# Patient Record
Sex: MALE | Race: WHITE | URBAN - METROPOLITAN AREA
[De-identification: names, ages, dates, MRNs, and addresses within clinical notes are randomized per-mention and may not be internally consistent; named-entity substitution may affect disease eponyms.]

---

## 2017-05-18 PROCEDURE — 81003 URINALYSIS AUTO W/O SCOPE: CPT | Performed by: FAMILY MEDICINE

## 2018-12-07 PROCEDURE — 88305 TISSUE EXAM BY PATHOLOGIST: CPT | Performed by: INTERNAL MEDICINE

## 2019-09-07 PROCEDURE — 84238 ASSAY NONENDOCRINE RECEPTOR: CPT | Performed by: INTERNAL MEDICINE

## 2024-09-14 ENCOUNTER — HOSPITAL ENCOUNTER (OUTPATIENT)
Age: 54
Discharge: HOME | End: 2024-09-14
Payer: COMMERCIAL

## 2024-09-14 VITALS
WEIGHT: 225 LBS | OXYGEN SATURATION: 98 % | DIASTOLIC BLOOD PRESSURE: 93 MMHG | SYSTOLIC BLOOD PRESSURE: 149 MMHG | HEART RATE: 74 BPM | TEMPERATURE: 97 F | BODY MASS INDEX: 28.88 KG/M2 | RESPIRATION RATE: 20 BRPM | HEIGHT: 74 IN

## 2024-09-14 DIAGNOSIS — S40.022A TRAUMATIC ECCHYMOSIS OF LEFT UPPER ARM, INITIAL ENCOUNTER: Primary | ICD-10-CM

## 2024-09-14 PROCEDURE — 99202 OFFICE O/P NEW SF 15 MIN: CPT

## 2024-09-14 PROCEDURE — 99203 OFFICE O/P NEW LOW 30 MIN: CPT

## 2024-09-14 RX ORDER — ASPIRIN 81 MG/1
81 TABLET ORAL AS NEEDED
COMMUNITY

## 2024-09-14 NOTE — ED PROVIDER NOTES
Patient Seen in: [facility]      History     Chief Complaint   Patient presents with    Bruising     Stated Complaint: bruising on left arm, per patient, \"internal bleeding\", numbness, tingly    Subjective:   52 yo male presents to the immediate care with c/o bruising.  Patient states he donated blood on Saturday.  When he was donating the phlebotomist didn't get right into the vein and missed it initially.  He was fine until Tuesday when his arm started to bruise.  He has been having some tingling to his forearm and fingers and the bruising has worsened.  This morning his bicep was \"jumping\" and he got concerned.  Patient has a history of hemochromatosis and donates blood every 2 months.  He was taking a baby aspirin daily, but stopped when the bruising started.  He denies any fever, chills, or heat to the arm.      The history is provided by the patient.         Objective:   Past Medical History:    BACK PAIN    Hemochromatosis    Varicella              Past Surgical History:   Procedure Laterality Date    Colonoscopy N/A 12/7/2018    Procedure: COLONOSCOPY, POSSIBLE BIOPSY, POSSIBLE POLYPECTOMY 18524;  Surgeon: Markie Villalba MD;  Location: [facility]    Egd  11/07/2019    Dr. Luna    [facility]                Social History     Socioeconomic History    Marital status:    Tobacco Use    Smoking status: Never    Smokeless tobacco: Never   Vaping Use    Vaping status: Never Used   Substance and Sexual Activity    Alcohol use: Yes     Comment: occasional    Drug use: No   Other Topics Concern    Exercise Yes              Review of Systems   Constitutional: Negative.    Musculoskeletal: Negative.  Negative for myalgias.   Skin:  Positive for color change.   All other systems reviewed and are negative.      Positive for stated Chief Complaint: Bruising    Other systems are as noted in HPI.  Constitutional and vital signs reviewed.      All other  systems reviewed and negative except as noted above.    Physical Exam     ED Triage Vitals [09/14/24 1215]   BP (!) 149/93   Pulse 74   Resp 20   Temp 97.4 °F (36.3 °C)   Temp src Temporal   SpO2 98 %   O2 Device None (Room air)       Current Vitals:   Vital Signs  BP: (!) 149/93  Pulse: 74  Resp: 20  Temp: 97.4 °F (36.3 °C)  Temp src: Temporal    Oxygen Therapy  SpO2: 98 %  O2 Device: None (Room air)            Physical Exam  Vitals and nursing note reviewed.   Constitutional:       General: He is not in acute distress.     Appearance: Normal appearance. He is normal weight. He is not ill-appearing.   HENT:      Head: Normocephalic and atraumatic.      Mouth/Throat:      Mouth: Mucous membranes are moist.      Pharynx: Oropharynx is clear.   Eyes:      Conjunctiva/sclera: Conjunctivae normal.   Cardiovascular:      Rate and Rhythm: Normal rate and regular rhythm.      Pulses: Normal pulses.      Heart sounds: Normal heart sounds.   Pulmonary:      Effort: Pulmonary effort is normal. No respiratory distress.      Breath sounds: Normal breath sounds.   Musculoskeletal:         General: Swelling and tenderness present. Normal range of motion.      Comments: Left superior and middle forearm are mildly edematous with deep purple ecchymosis with yellowing at the edges.  There is tenderness with palpation of the area.  ROM, motor strength and sensation intact. Cap refill <2 sec and radial pulse is 2+.    Skin:     General: Skin is warm and dry.      Capillary Refill: Capillary refill takes less than 2 seconds.      Findings: Bruising present.   Neurological:      General: No focal deficit present.      Mental Status: He is alert and oriented to person, place, and time.   Psychiatric:         Mood and Affect: Mood normal.         Behavior: Behavior normal.           ED Course   Labs Reviewed - No data to display           MDM              Medical Decision Making  53-year-old male with traumatic ecchymosis of the left  forearm.  Supportive management at home.  No evidence of sepsis or cellulitis.  F/u with PCP or return as needed.  Do not feel that any labs, imaging, or antibiotics are necessary at this time.    Risk  OTC drugs.        Disposition and Plan     Clinical Impression:  1. Traumatic ecchymosis of left upper arm, initial encounter         Disposition:  Discharge  9/14/2024  1:06 pm    Follow-up:  Leno Nieves MD  [address] [place] [address] [phone]      As needed          Medications Prescribed:  Discharge Medication List as of 9/14/2024  1:09 PM

## 2024-09-14 NOTE — DISCHARGE INSTRUCTIONS
Rest and alternate cool compresses with warm, moist compresses.   Take Tylenol and/or ibuprofen as needed for pain.   Continue to hold the baby aspirin until resolved.   Follow up with your PCP as needed.

## 2024-09-14 NOTE — ED INITIAL ASSESSMENT (HPI)
Pt c/o left arm bruising after donated blood 9/7/24, bruising , bicep muscle spasm, slight numbness and tingling to right fingers started 9/10/24. Pt states he spoke with pcp 9/13/24.

## 2025-01-30 ENCOUNTER — OFFICE VISIT (OUTPATIENT)
Dept: FAMILY MEDICINE CLINIC | Facility: CLINIC | Age: 55
End: 2025-01-30
Payer: COMMERCIAL

## 2025-01-30 ENCOUNTER — TELEPHONE (OUTPATIENT)
Dept: FAMILY MEDICINE CLINIC | Facility: CLINIC | Age: 55
End: 2025-01-30

## 2025-01-30 VITALS
HEART RATE: 79 BPM | DIASTOLIC BLOOD PRESSURE: 80 MMHG | OXYGEN SATURATION: 99 % | SYSTOLIC BLOOD PRESSURE: 122 MMHG | RESPIRATION RATE: 18 BRPM | HEIGHT: 74 IN | WEIGHT: 222 LBS | BODY MASS INDEX: 28.49 KG/M2

## 2025-01-30 DIAGNOSIS — R09.81 NASAL CONGESTION: ICD-10-CM

## 2025-01-30 DIAGNOSIS — B35.1 ONYCHOMYCOSIS: Primary | ICD-10-CM

## 2025-01-30 DIAGNOSIS — M77.50 TENDONITIS OF ANKLE OR FOOT: ICD-10-CM

## 2025-01-30 DIAGNOSIS — M76.61 ACHILLES TENDINITIS OF RIGHT LOWER EXTREMITY: ICD-10-CM

## 2025-01-30 DIAGNOSIS — Z00.00 LABORATORY EXAMINATION ORDERED AS PART OF A COMPLETE PHYSICAL EXAMINATION: ICD-10-CM

## 2025-01-30 DIAGNOSIS — E83.110 HEREDITARY HEMOCHROMATOSIS: ICD-10-CM

## 2025-01-30 DIAGNOSIS — G47.33 OSA (OBSTRUCTIVE SLEEP APNEA): ICD-10-CM

## 2025-01-30 PROCEDURE — 99204 OFFICE O/P NEW MOD 45 MIN: CPT | Performed by: FAMILY MEDICINE

## 2025-01-30 PROCEDURE — 3074F SYST BP LT 130 MM HG: CPT | Performed by: FAMILY MEDICINE

## 2025-01-30 PROCEDURE — 3008F BODY MASS INDEX DOCD: CPT | Performed by: FAMILY MEDICINE

## 2025-01-30 PROCEDURE — 3079F DIAST BP 80-89 MM HG: CPT | Performed by: FAMILY MEDICINE

## 2025-01-30 RX ORDER — FLUTICASONE PROPIONATE 50 MCG
SPRAY, SUSPENSION (ML) NASAL
COMMUNITY
Start: 2024-03-02 | End: 2025-01-30

## 2025-01-30 RX ORDER — FLUTICASONE PROPIONATE 50 MCG
2 SPRAY, SUSPENSION (ML) NASAL DAILY
Qty: 3 EACH | Refills: 3 | Status: SHIPPED | OUTPATIENT
Start: 2025-01-30

## 2025-01-30 RX ORDER — EFINACONAZOLE 100 MG/ML
1 SOLUTION TOPICAL DAILY
Qty: 12 ML | Refills: 3 | Status: SHIPPED | OUTPATIENT
Start: 2025-01-30

## 2025-01-30 NOTE — PROGRESS NOTES
Subjective:   Patient ID: Jd Howard is a 54 year old male.    Chronic marsha.  Using mouth piece.  It cracked.    Walks a lot regularly.  Now if walking down the steps or just randomly at time has pain in right achilles.      Onychomycosis.  On Jublia and its helping.  Wants to continue it.    Nasal congestion.  Wants refill on fluticasone.     Hereditary hemochromatosis.  Seeing hematologist.  Getting blood drawn every 2 months.  Last ferritin level was too low.          History/Other:   Review of Systems   All other systems reviewed and are negative.    Current Outpatient Medications   Medication Sig Dispense Refill    Efinaconazole (JUBLIA) 10 % External Solution Apply 1 Application topically daily. 12 mL 3    fluticasone propionate 50 MCG/ACT Nasal Suspension 2 sprays by Each Nare route daily. 3 each 3    aspirin 81 MG Oral Tab EC Take 1 tablet (81 mg total) by mouth as needed for Pain.       Allergies:Allergies[1]    Objective:   Physical Exam  Vitals reviewed.   Constitutional:       General: He is not in acute distress.     Appearance: He is well-developed. He is not diaphoretic.   Eyes:      General: No scleral icterus.        Right eye: No discharge.         Left eye: No discharge.      Conjunctiva/sclera: Conjunctivae normal.   Cardiovascular:      Rate and Rhythm: Normal rate and regular rhythm.      Heart sounds: Normal heart sounds.   Pulmonary:      Effort: Pulmonary effort is normal. No respiratory distress.      Breath sounds: Normal breath sounds. No wheezing or rales.   Musculoskeletal:      Comments: No ankle or achilles or heel tenderness.  No swelling.  no pain with toe off.  No pain with ankle inversion.   Skin:     Comments: Onychomychosis left big toe.         Assessment & Plan:   1. Onychomycosis    2. Nasal congestion    3. MARSHA (obstructive sleep apnea)    4. Achilles tendinitis of right lower extremity    5. Tendonitis of ankle or foot    6. Hereditary hemochromatosis    7. Laboratory  examination ordered as part of a complete physical examination      1. Onychomycosis  - Efinaconazole (JUBLIA) 10 % External Solution; Apply 1 Application topically daily.  Dispense: 12 mL; Refill: 3    2. Nasal congestion  - fluticasone propionate 50 MCG/ACT Nasal Suspension; 2 sprays by Each Nare route daily.  Dispense: 3 each; Refill: 3  - ENT Referral - In Network    3. JOSE (obstructive sleep apnea)  Try to get oral mouth piece.    4. Achilles tendinitis of right lower extremity  - OP REFERRAL TO [facility]    5. Tendonitis of ankle or foot  - OP REFERRAL TO [facility]    6. Hereditary hemochromatosis  - Ferritin; Future  - Iron And Tibc [E]; Future    7. Laboratory examination ordered as part of a complete physical examination  - Lipid Panel; Future  - PSA Total, Screen; Future  - CBC With Differential With Platelet; Future  - Comp Metabolic Panel (14); Future  - TSH W Reflex To Free T4; Future  - Ferritin; Future  - Vitamin D; Future  - Iron And Tibc [E]; Future      Orders Placed This Encounter   Procedures    Lipid Panel    PSA Total, Screen    CBC With Differential With Platelet    Comp Metabolic Panel (14)    TSH W Reflex To Free T4    Ferritin    Vitamin D    Iron And Tibc [E]       Meds This Visit:  Requested Prescriptions     Signed Prescriptions Disp Refills    Efinaconazole (JUBLIA) 10 % External Solution 12 mL 3     Sig: Apply 1 Application topically daily.    fluticasone propionate 50 MCG/ACT Nasal Suspension 3 each 3     Si sprays by Each Nare route daily.       Imaging & Referrals:  OP REFERRAL TO [facility]  ENT - INTERNAL       [1] No Known Allergies

## 2025-01-30 NOTE — PATIENT INSTRUCTIONS
Consider getting Aspire procedure for sleep apnea, but if you get Nasal Surgery you might not need this.

## 2025-01-30 NOTE — TELEPHONE ENCOUNTER
Call people below.  Look up number in google.  Pt doesn't have it.  [facility] sleep better appliance.  [employer]    His mouthpiece cracked.  Can he get the same one they gave him last time for sleep apnea.

## 2025-01-31 NOTE — TELEPHONE ENCOUNTER
Can't they pull up his records and order the same?  If they pull up his record we could write a script.

## 2025-01-31 NOTE — TELEPHONE ENCOUNTER
I called [facility]  at [phone] they state they need the info from the ordering physician and original invoice # from order.I notified patient he states this was ordered through his dentist previously who has since retired. [facility] would not provide me with any info if this appliance  could be replaced.  Patient wants Dr Lyn to order for him.Please advise. Should he contact dentist?

## 2025-02-01 ENCOUNTER — LAB ENCOUNTER (OUTPATIENT)
Dept: LAB | Age: 55
End: 2025-02-01
Attending: FAMILY MEDICINE
Payer: COMMERCIAL

## 2025-02-01 DIAGNOSIS — Z00.00 LABORATORY EXAMINATION ORDERED AS PART OF A COMPLETE PHYSICAL EXAMINATION: ICD-10-CM

## 2025-02-01 DIAGNOSIS — E83.110 HEREDITARY HEMOCHROMATOSIS: ICD-10-CM

## 2025-02-01 LAB
DEPRECATED HBV CORE AB SER IA-ACNC: 13 NG/ML
IRON SATN MFR SERPL: 25 %
IRON SERPL-MCNC: 76 UG/DL
TOTAL IRON BINDING CAPACITY: 300 UG/DL (ref 250–425)
TRANSFERRIN SERPL-MCNC: 236 MG/DL (ref 215–365)

## 2025-02-01 PROCEDURE — 83540 ASSAY OF IRON: CPT | Performed by: FAMILY MEDICINE

## 2025-02-01 PROCEDURE — 83550 IRON BINDING TEST: CPT | Performed by: FAMILY MEDICINE

## 2025-02-01 PROCEDURE — 82728 ASSAY OF FERRITIN: CPT | Performed by: FAMILY MEDICINE

## 2025-02-02 ENCOUNTER — PATIENT MESSAGE (OUTPATIENT)
Dept: FAMILY MEDICINE CLINIC | Facility: CLINIC | Age: 55
End: 2025-02-02

## 2025-02-03 DIAGNOSIS — B35.1 ONYCHOMYCOSIS: ICD-10-CM

## 2025-02-03 NOTE — TELEPHONE ENCOUNTER
Medication requested: Efinaconazole (JUBLIA) 10 % External Solution           Pharmacy name/location:  [id number] [address] [phone], [phone] [[id number]]     LOV:  1/30/25      Additional notes:  patient states script not avail through [facility]

## 2025-02-04 RX ORDER — EFINACONAZOLE 100 MG/ML
1 SOLUTION TOPICAL DAILY
Qty: 12 ML | Refills: 3 | Status: SHIPPED | OUTPATIENT
Start: 2025-02-04

## 2025-04-07 ENCOUNTER — OFFICE VISIT (OUTPATIENT)
Dept: FAMILY MEDICINE CLINIC | Facility: CLINIC | Age: 55
End: 2025-04-07
Payer: COMMERCIAL

## 2025-04-07 ENCOUNTER — LAB ENCOUNTER (OUTPATIENT)
Dept: LAB | Age: 55
End: 2025-04-07
Attending: FAMILY MEDICINE
Payer: COMMERCIAL

## 2025-04-07 VITALS
BODY MASS INDEX: 27.59 KG/M2 | SYSTOLIC BLOOD PRESSURE: 118 MMHG | WEIGHT: 215 LBS | OXYGEN SATURATION: 98 % | DIASTOLIC BLOOD PRESSURE: 82 MMHG | HEART RATE: 78 BPM | RESPIRATION RATE: 16 BRPM | HEIGHT: 74 IN

## 2025-04-07 DIAGNOSIS — Z00.00 LABORATORY EXAMINATION ORDERED AS PART OF A COMPLETE PHYSICAL EXAMINATION: ICD-10-CM

## 2025-04-07 DIAGNOSIS — Z23 NEED FOR SHINGLES VACCINE: ICD-10-CM

## 2025-04-07 DIAGNOSIS — Z00.00 ANNUAL PHYSICAL EXAM: Primary | ICD-10-CM

## 2025-04-07 DIAGNOSIS — Z00.00 ANNUAL PHYSICAL EXAM: ICD-10-CM

## 2025-04-07 DIAGNOSIS — E55.9 VITAMIN D DEFICIENCY: ICD-10-CM

## 2025-04-07 DIAGNOSIS — E61.1 IRON DEFICIENCY: ICD-10-CM

## 2025-04-07 LAB
ALBUMIN SERPL-MCNC: 4.9 G/DL (ref 3.2–4.8)
ALBUMIN/GLOB SERPL: 1.5 {RATIO} (ref 1–2)
ALP LIVER SERPL-CCNC: 75 U/L
ALT SERPL-CCNC: 26 U/L
ANION GAP SERPL CALC-SCNC: 12 MMOL/L (ref 0–18)
AST SERPL-CCNC: 28 U/L (ref ?–34)
BASOPHILS # BLD AUTO: 0.02 X10(3) UL (ref 0–0.2)
BASOPHILS NFR BLD AUTO: 0.4 %
BILIRUB SERPL-MCNC: 0.5 MG/DL (ref 0.3–1.2)
BUN BLD-MCNC: 12 MG/DL (ref 9–23)
CALCIUM BLD-MCNC: 10 MG/DL (ref 8.7–10.6)
CHLORIDE SERPL-SCNC: 105 MMOL/L (ref 98–112)
CHOLEST SERPL-MCNC: 163 MG/DL (ref ?–200)
CO2 SERPL-SCNC: 24 MMOL/L (ref 21–32)
COMPLEXED PSA SERPL-MCNC: 1.28 NG/ML (ref ?–4)
CREAT BLD-MCNC: 1.17 MG/DL
DEPRECATED HBV CORE AB SER IA-ACNC: 31 NG/ML
EGFRCR SERPLBLD CKD-EPI 2021: 74 ML/MIN/1.73M2 (ref 60–?)
EOSINOPHIL # BLD AUTO: 0.06 X10(3) UL (ref 0–0.7)
EOSINOPHIL NFR BLD AUTO: 1.1 %
ERYTHROCYTE [DISTWIDTH] IN BLOOD BY AUTOMATED COUNT: 13.2 %
FASTING PATIENT LIPID ANSWER: YES
FASTING STATUS PATIENT QL REPORTED: YES
GLOBULIN PLAS-MCNC: 3.2 G/DL (ref 2–3.5)
GLUCOSE BLD-MCNC: 106 MG/DL (ref 70–99)
HCT VFR BLD AUTO: 51.6 %
HDLC SERPL-MCNC: 45 MG/DL (ref 40–59)
HGB BLD-MCNC: 16.7 G/DL
IMM GRANULOCYTES # BLD AUTO: 0.01 X10(3) UL (ref 0–1)
IMM GRANULOCYTES NFR BLD: 0.2 %
LDLC SERPL CALC-MCNC: 95 MG/DL (ref ?–100)
LYMPHOCYTES # BLD AUTO: 1.23 X10(3) UL (ref 1–4)
LYMPHOCYTES NFR BLD AUTO: 23.3 %
MCH RBC QN AUTO: 29 PG (ref 26–34)
MCHC RBC AUTO-ENTMCNC: 32.4 G/DL (ref 31–37)
MCV RBC AUTO: 89.6 FL
MONOCYTES # BLD AUTO: 0.51 X10(3) UL (ref 0.1–1)
MONOCYTES NFR BLD AUTO: 9.7 %
NEUTROPHILS # BLD AUTO: 3.44 X10 (3) UL (ref 1.5–7.7)
NEUTROPHILS # BLD AUTO: 3.44 X10(3) UL (ref 1.5–7.7)
NEUTROPHILS NFR BLD AUTO: 65.3 %
NONHDLC SERPL-MCNC: 118 MG/DL (ref ?–130)
OSMOLALITY SERPL CALC.SUM OF ELEC: 292 MOSM/KG (ref 275–295)
PLATELET # BLD AUTO: 216 10(3)UL (ref 150–450)
POTASSIUM SERPL-SCNC: 4.2 MMOL/L (ref 3.5–5.1)
PROT SERPL-MCNC: 8.1 G/DL (ref 5.7–8.2)
RBC # BLD AUTO: 5.76 X10(6)UL
SODIUM SERPL-SCNC: 141 MMOL/L (ref 136–145)
TRIGL SERPL-MCNC: 126 MG/DL (ref 30–149)
TSI SER-ACNC: 1.58 UIU/ML (ref 0.55–4.78)
VIT D+METAB SERPL-MCNC: 29.2 NG/ML (ref 30–100)
VIT D+METAB SERPL-MCNC: 30.1 NG/ML (ref 30–100)
VLDLC SERPL CALC-MCNC: 21 MG/DL (ref 0–30)
WBC # BLD AUTO: 5.3 X10(3) UL (ref 4–11)

## 2025-04-07 PROCEDURE — 84153 ASSAY OF PSA TOTAL: CPT | Performed by: FAMILY MEDICINE

## 2025-04-07 PROCEDURE — 82306 VITAMIN D 25 HYDROXY: CPT | Performed by: FAMILY MEDICINE

## 2025-04-07 PROCEDURE — 80061 LIPID PANEL: CPT | Performed by: FAMILY MEDICINE

## 2025-04-07 PROCEDURE — 80050 GENERAL HEALTH PANEL: CPT | Performed by: FAMILY MEDICINE

## 2025-04-07 PROCEDURE — 82728 ASSAY OF FERRITIN: CPT | Performed by: FAMILY MEDICINE

## 2025-04-07 NOTE — PROGRESS NOTES
Subjective:   Patient ID: Jd Howard is a 54 year old male.    He is here for F/U visit.  Feeling stressed in ankles without wearing shoes or sleepers.   Planned for colorectal screening  Takes Alcohol 10 per week.        History/Other:   Review of Systems   All other systems reviewed and are negative.    Current Outpatient Medications   Medication Sig Dispense Refill    Efinaconazole (JUBLIA) 10 % External Solution Apply 1 Application topically daily. 12 mL 3    fluticasone propionate 50 MCG/ACT Nasal Suspension 2 sprays by Each Nare route daily. 3 each 3    aspirin 81 MG Oral Tab EC Take 1 tablet (81 mg total) by mouth as needed for Pain.       Allergies:Allergies[1]    Objective:   Physical Exam  Constitutional:       Appearance: He is well-developed.   HENT:      Head: Normocephalic and atraumatic.      Right Ear: External ear normal.      Left Ear: External ear normal.      Nose: Nose normal.   Eyes:      Conjunctiva/sclera: Conjunctivae normal.      Pupils: Pupils are equal, round, and reactive to light.   Cardiovascular:      Rate and Rhythm: Normal rate and regular rhythm.      Heart sounds: Normal heart sounds.   Pulmonary:      Effort: Pulmonary effort is normal.      Breath sounds: Normal breath sounds.   Abdominal:      General: Bowel sounds are normal.      Palpations: Abdomen is soft.   Musculoskeletal:      Cervical back: Normal range of motion and neck supple.      Comments: Varicose vein + - non tender    Skin:     General: Skin is warm and dry.   Neurological:      Mental Status: He is alert and oriented to person, place, and time.      Deep Tendon Reflexes: Reflexes are normal and symmetric.         Assessment & Plan:   1. Annual physical exam    2. Iron deficiency    3. Vitamin D deficiency    4. Need for shingles vaccine      1. Annual physical exam  - Lipid Panel; Future  - CBC With Differential With Platelet; Future  - PSA Total, Screen; Future  - Comp Metabolic Panel (14); Future  - TSH  W Reflex To Free T4; Future  - Ferritin; Future  - Vitamin D; Future    2. Iron deficiency  - Ferritin; Future    3. Vitamin D deficiency  - Vitamin D; Future    4. Need for shingles vaccine  - Zoster Recombinant Adjuvanted (Shingrix -Shingles) [[id number]]; Future  - Zoster Recombinant Adjuvanted (Shingrix -Shingles) [[id number]]    Orders Placed This Encounter   Procedures    Lipid Panel    CBC With Differential With Platelet    PSA Total, Screen    Comp Metabolic Panel (14)    TSH W Reflex To Free T4    Ferritin    Vitamin D    Zoster Recombinant Adjuvanted (Shingrix -Shingles) [[id number]]    Zoster Recombinant Adjuvanted (Shingrix -Shingles) [[id number]]       Meds This Visit:  Requested Prescriptions      No prescriptions requested or ordered in this encounter       Imaging & Referrals:  ZOSTER VACC RECOMBINANT IM [facility]  ZOSTER VACC RECOMBINANT IM [facility]         [1] No Known Allergies

## 2025-04-17 NOTE — H&P
The 21st Century Cures Act makes medical notes like these available to patients in the interest of transparency. Please be advised this is a medical document. Medical documents are intended to carry relevant information, facts as evident, and the clinical opinion of the practitioner. The medical note is intended as peer to peer communication and may appear blunt or direct. It is written in medical language and may contain abbreviations or verbiage that are unfamiliar.   Jd Howard is a 54 year old male who presents for a complete physical exam.       HPI:   Feeling stressed in ankles without wearing shoes or sleepers.   Planned for colorectal screening  Takes Alcohol 10 per week.    Colonoscopy: 12/07/2018    Current Medications[1]   Past Medical History[2]   Past Surgical History[3]   Family History[4]   Social History:  Short Social Hx on File[5]      REVIEW OF SYSTEMS:   GENERAL: feels well otherwise  SKIN: denies any unusual skin lesions or rash  EYES:denies blurred vision or double vision  HEENT: denies nasal congestion, sinus pain or ST, denies decreased hearing  LUNGS: denies shortness of breath or frequent cough  CV: denies chest pain, pressure or palpitations  GI: denies abdominal pain, heartburn, chronic diarrhea or constipation  : denies nocturia or changes in stream, denies erectile dysfunction  MS: denies back pain, myalgias or arthralgias  NEURO: denies headaches or dizziness  PSYCH: denies depression or anxiety  HEMATOLOGIC: denies bruising or bleeding easily  ENDOCRINE: denies frequent thirst or urination, denies unintentional weight gain/loss    EXAM:   /82   Pulse 78   Resp 16   Ht 6' 2\" (1.88 m)   Wt 215 lb (97.5 kg)   SpO2 98%   BMI 27.60 kg/m²       Body mass index is 27.6 kg/m².     GENERAL: well developed, well nourished,in no apparent distress  SKIN: no rashes,no suspicious lesions  HEENT: atraumatic, normocephalic,TMs clear, posterior pharynx clear  EYES:  PERRLA,conjunctiva clear  NECK: supple,no thyromegaly, no adenopathy  LUNGS: clear to auscultation, easy breathing, no cough  CV: S1 S2 noted, RRR, no murmur  GI: active bowel sounds, no rebound/rigidity/tenderness, no HSM  : 2 descended testes, no scrotal mass or tenderness, normal penis no lesions, no hernia  EXT: no cyanosis, clubbing or edema  NEURO: Alert & oriented x 3, LEs +2DTRs  PSYCH: Mood and affect appropriate    ASSESSMENT AND PLAN:   Jd Howard is a 54 year old male who presents for a complete physical exam. Heart healthy diet, routine exercise, and annual flu vaccines encouraged.  The patient indicates understanding of these issues and agrees to the plan.    1. Annual physical exam  - Lipid Panel; Future  - CBC With Differential With Platelet; Future  - PSA Total, Screen; Future  - Comp Metabolic Panel (14); Future  - TSH W Reflex To Free T4; Future  - Ferritin; Future  - Vitamin D; Future    2. Iron deficiency  - Ferritin; Future    3. Vitamin D deficiency  - Vitamin D; Future    4. Need for shingles vaccine  - Zoster Recombinant Adjuvanted (Shingrix -Shingles) [[id number]]; Future  - Zoster Recombinant Adjuvanted (Shingrix -Shingles) [[id number]]    Orders Placed This Encounter   Procedures    Lipid Panel     Standing Status:   Future     Number of Occurrences:   1     Expected Date:   4/7/2025     Expiration Date:   4/7/2026    CBC With Differential With Platelet     Standing Status:   Future     Number of Occurrences:   1     Expected Date:   4/7/2025     Expiration Date:   4/7/2026    PSA Total, Screen     Standing Status:   Future     Number of Occurrences:   1     Expected Date:   4/7/2025     Expiration Date:   4/7/2026    Comp Metabolic Panel (14)     Standing Status:   Future     Number of Occurrences:   1     Expected Date:   4/7/2025     Expiration Date:   4/7/2026    TSH W Reflex To Free T4     Standing Status:   Future     Number of Occurrences:   1     Expected Date:   4/7/2025      Expiration Date:   4/7/2026    Ferritin     Standing Status:   Future     Number of Occurrences:   1     Expected Date:   4/7/2025     Expiration Date:   4/7/2026    Vitamin D     Standing Status:   Future     Number of Occurrences:   1     Expected Date:   4/7/2025     Expiration Date:   4/7/2026     Please pick the scenario that best fits the purpose for ordering this test:   General Screening/Vit D deficiency (25-Hydroxy)     Release to patient:   Immediate    Zoster Recombinant Adjuvanted (Shingrix -Shingles) [[id number]]     Standing Status:   Future     Expected Date:   7/7/2025     Expiration Date:   4/7/2026    Zoster Recombinant Adjuvanted (Shingrix -Shingles) [[id number]]     Follow up in 1 year.         [1]   Current Outpatient Medications   Medication Sig Dispense Refill    Efinaconazole (JUBLIA) 10 % External Solution Apply 1 Application topically daily. 12 mL 3    fluticasone propionate 50 MCG/ACT Nasal Suspension 2 sprays by Each Nare route daily. 3 each 3    aspirin 81 MG Oral Tab EC Take 1 tablet (81 mg total) by mouth as needed for Pain.     [2]   Past Medical History:   BACK PAIN    Hemochromatosis    Varicella   [3]   Past Surgical History:  Procedure Laterality Date    Colonoscopy N/A 12/7/2018    Procedure: COLONOSCOPY, POSSIBLE BIOPSY, POSSIBLE POLYPECTOMY 53215;  Surgeon: Markie Villalba MD;  [facility]    Egd  11/07/2019    Dr. Luna    Other surgical history  2012    [facility]   [4]   Family History  Problem Relation Age of Onset    Heart Disorder Father         MI x2    Hypertension Brother    [5]   Social History  Socioeconomic History    Marital status:    Tobacco Use    Smoking status: Never    Smokeless tobacco: Never   Vaping Use    Vaping status: Never Used   Substance and Sexual Activity    Alcohol use: Yes     Comment: occasional    Drug use: No   Other Topics Concern    Exercise Yes     Social Drivers of Health     Food Insecurity: No Food Insecurity (4/7/2025)     [place]SS - Food Insecurity     Worried About Running Out of Food in the Last Year: No     Ran Out of Food in the Last Year: No   Transportation Needs: No Transportation Needs (4/7/2025)    NCSS - Transportation     Lack of Transportation: No   Housing Stability: Not At Risk (4/7/2025)    NCSS - Housing/Utilities     Has Housing: Yes     Worried About Losing Housing: No     Unable to Get Utilities: No

## 2025-05-30 ENCOUNTER — OFFICE VISIT (OUTPATIENT)
Dept: OCCUPATIONAL MEDICINE | Age: 55
End: 2025-05-30

## 2025-05-30 DIAGNOSIS — Z00.8 HEALTH EXAMINATION IN POPULATION SURVEYS: Primary | ICD-10-CM
